# Patient Record
(demographics unavailable — no encounter records)

---

## 2025-03-12 NOTE — IMAGING
[Straightening consistent with spasm] : Straightening consistent with spasm [No bony abnormalities] : No bony abnormalities [de-identified] : CSPINE Inspection: No rash or ecchymosis Palpation: spasm in traps, rhomboids, paracervicals ROM: Full with stiffness Strength: 5/5 bilateral deltoid, biceps, triceps, wrist flexors, wrist extensors, , abductors Sensation: Sensation present to light touch bilateral C5-T1 distributions Reflexes: Negative Hernandez's bilaterally

## 2025-03-12 NOTE — HISTORY OF PRESENT ILLNESS
[de-identified] : 3/12/25- [facility] MARVIN ESPANA is a 46 year old female presenting with upper back pain, no reported injury. Patient states pain started a week ago, intermittent worsening pain. Patient denies N/T, no prior history of upper back pain. Denies radicular pain. Pain with coughing and moving. Patient notes typing for work with a laptop with bad posture.

## 2025-03-12 NOTE — ASSESSMENT
[FreeTextEntry1] : Discussed TPI if sxs don't improve  PT, meds  Will discuss MRI f/u 2 months  Muscle Relaxants- To help decrease muscle spasm and assist with pain relief. Advised of sedating effects and instructed not to drive, operate heavy machinery, or take with other sedating medications.  NSAIDs- Patient warned of risk of medication to GI tract, increased blood pressure, cardiac risk, and risk of fluid retention.  Advised to clear medication with internist or PCP if any concurrent health problem with heart, blood pressure, or GI system exists.

## 2025-04-02 NOTE — END OF VISIT
[TextEntry] : IHelen, am scribing for and the presence of Dr. Ct Melvin the following sections HISTORY OF PRESENT ILLNESS, PAST MEDICAL/FAMILY/SOCIAL HISTORY; REVIEW OF SYSTEMS; PHYSICAL EXAM; DISPOSITION.